# Patient Record
Sex: MALE | Race: WHITE | URBAN - METROPOLITAN AREA
[De-identification: names, ages, dates, MRNs, and addresses within clinical notes are randomized per-mention and may not be internally consistent; named-entity substitution may affect disease eponyms.]

---

## 2018-09-23 ENCOUNTER — OFFICE VISIT (OUTPATIENT)
Dept: FAMILY MEDICINE CLINIC | Age: 58
End: 2018-09-23
Payer: COMMERCIAL

## 2018-09-23 VITALS
RESPIRATION RATE: 18 BRPM | HEART RATE: 68 BPM | SYSTOLIC BLOOD PRESSURE: 134 MMHG | BODY MASS INDEX: 40.43 KG/M2 | HEIGHT: 69 IN | WEIGHT: 273 LBS | DIASTOLIC BLOOD PRESSURE: 78 MMHG | OXYGEN SATURATION: 95 % | TEMPERATURE: 98.5 F

## 2018-09-23 DIAGNOSIS — H01.004 BLEPHARITIS OF LEFT UPPER EYELID, UNSPECIFIED TYPE: Primary | ICD-10-CM

## 2018-09-23 DIAGNOSIS — H02.846 SWELLING OF LEFT EYELID: ICD-10-CM

## 2018-09-23 PROCEDURE — 99201 PR OFFICE OUTPATIENT NEW 10 MINUTES: CPT | Performed by: NURSE PRACTITIONER

## 2018-09-23 RX ORDER — POLYETHYLENE GLYCOL 3350, SODIUM CHLORIDE, SODIUM BICARBONATE, POTASSIUM CHLORIDE 420; 11.2; 5.72; 1.48 G/4L; G/4L; G/4L; G/4L
POWDER, FOR SOLUTION ORAL
COMMUNITY
Start: 2018-07-02 | End: 2018-09-23 | Stop reason: ALTCHOICE

## 2018-09-23 RX ORDER — HYDROCORTISONE 5 MG/1
TABLET ORAL
COMMUNITY
Start: 2018-09-05

## 2018-09-23 RX ORDER — ERYTHROMYCIN 5 MG/G
OINTMENT OPHTHALMIC
Qty: 1 TUBE | Refills: 0 | Status: SHIPPED | OUTPATIENT
Start: 2018-09-23

## 2018-09-23 RX ORDER — METFORMIN HYDROCHLORIDE 500 MG/1
TABLET, EXTENDED RELEASE ORAL
COMMUNITY
Start: 2018-09-07

## 2018-09-23 RX ORDER — TESTOSTERONE GEL, 1% 10 MG/G
GEL TRANSDERMAL
COMMUNITY
Start: 2018-09-06

## 2018-09-23 RX ORDER — PREDNISONE 20 MG/1
40 TABLET ORAL DAILY
Qty: 6 TABLET | Refills: 0 | Status: SHIPPED | OUTPATIENT
Start: 2018-09-23 | End: 2018-09-26

## 2018-09-23 RX ORDER — LISINOPRIL 40 MG/1
TABLET ORAL
COMMUNITY
Start: 2018-09-07

## 2018-09-23 RX ORDER — METOPROLOL SUCCINATE 100 MG/1
TABLET, EXTENDED RELEASE ORAL
COMMUNITY
Start: 2018-08-28

## 2018-09-23 ASSESSMENT — PATIENT HEALTH QUESTIONNAIRE - PHQ9
SUM OF ALL RESPONSES TO PHQ9 QUESTIONS 1 & 2: 0
1. LITTLE INTEREST OR PLEASURE IN DOING THINGS: 0
2. FEELING DOWN, DEPRESSED OR HOPELESS: 0
SUM OF ALL RESPONSES TO PHQ QUESTIONS 1-9: 0
SUM OF ALL RESPONSES TO PHQ QUESTIONS 1-9: 0

## 2018-09-23 ASSESSMENT — ENCOUNTER SYMPTOMS
VOMITING: 0
BLURRED VISION: 0
EYE ITCHING: 0
DOUBLE VISION: 0
EYE REDNESS: 1
FOREIGN BODY SENSATION: 0
PHOTOPHOBIA: 0
EYE DISCHARGE: 0

## 2018-09-23 NOTE — PATIENT INSTRUCTIONS
for changes in your health, and be sure to contact your doctor if:    · You have vision changes.     · You do not get better as expected. Where can you learn more? Go to [email or web address] org and sign in to your [employer] account. Enter Q727 in the Shipster box to learn more about \"Blepharitis: Care Instructions. \"     If you do not have an account, please click on the \"Sign Up Now\" link. Current as of: December 3, 2017  Content Version: 11.7  © 3544-1682 [employer]. Care instructions adapted under license by [facility] ([facility]). If you have questions about a medical condition or this instruction, always ask your healthcare professional. [address] any warranty or liability for your use of this information.

## 2018-09-23 NOTE — PROGRESS NOTES
Allergies    Subjective:      Review of Systems   Constitutional: Negative for fever. Eyes: Positive for redness (left lid). Negative for blurred vision, double vision, photophobia, discharge, itching and visual disturbance. Gastrointestinal: Negative for vomiting. Skin: Negative for rash. All other systems reviewed and are negative. Objective:     Physical Exam   Constitutional: He is oriented to person, place, and time. He appears well-developed and well-nourished. No distress. HENT:   Head: Normocephalic and atraumatic. Right Ear: Tympanic membrane normal.   Left Ear: Tympanic membrane normal.   Nose: Nose normal.   Mouth/Throat: Uvula is midline and oropharynx is clear and moist.   Eyes: Pupils are equal, round, and reactive to light. Conjunctivae and EOM are normal. Lids are everted and swept, no foreign bodies found. Right eye exhibits no chemosis, no discharge, no exudate and no hordeolum. No foreign body present in the right eye. Left eye exhibits no chemosis, no discharge, no exudate and no hordeolum. No foreign body present in the left eye. No scleral icterus. Left upper eyelid mild erythema and swelling on rim where lashes are. No scale. No sty. No discharge. No tearing noted. No injection. No fb noted. No facial rash noted   Neck: Normal range of motion. Neck supple. Cardiovascular: Normal rate, regular rhythm and normal heart sounds. No murmur heard. Pulmonary/Chest: Effort normal and breath sounds normal. No respiratory distress. He has no wheezes. He has no rales. Abdominal: Soft. Bowel sounds are normal. There is no tenderness. Musculoskeletal: Normal range of motion. He exhibits no edema. Moves upper and lower extremities  No lower leg edema noted   Lymphadenopathy:     He has no cervical adenopathy. Neurological: He is alert and oriented to person, place, and time. No cranial nerve deficit. Skin: Skin is warm and dry. No rash noted. He is not diaphoretic. Psychiatric: He has a normal mood and affect. His behavior is normal.   Nursing note and vitals reviewed. /78 (Site: Left Upper Arm, Position: Sitting, Cuff Size: Large Adult)   Pulse 68   Temp 98.5 °F (36.9 °C) (Tympanic)   Resp 18   Ht 5' 9\" (1.753 m)   Wt 273 lb (123.8 kg)   SpO2 95%   BMI 40.32 kg/m²     Assessment:       Diagnosis Orders   1. Blepharitis of left upper eyelid, unspecified type  erythromycin (ROMYCIN) 5 MG/GM ophthalmic ointment   2. Swelling of left eyelid  predniSONE (DELTASONE) 20 MG tablet     Suspect blepharitis, no obvious insect sting noted  Plan:    Warm moist compresses  rx emycin and prednisone  Wash with baby shampoo, tear free, 2 times day  Recommend taking claritin or zyrtec as directed over the counter  Avoid rubbing  Recheck for pain, vision change, rash, increased swelling, fever,eye redness, drainage, worsening or concern  Follow up with primary care or your eye dr this week  Monitor blood sugar when on prednisone, call your doctor if elevated above 300              Patient given educational materials - see patient instructions. Discussed use, benefit, and side effects of prescribed medications. All patient questions answered. Pt voiced understanding.     Electronically signed by VALENTE Ayon CNP on 9/23/2018 at 1:59 PM